# Patient Record
Sex: MALE | Race: WHITE | Employment: OTHER | URBAN - NONMETROPOLITAN AREA
[De-identification: names, ages, dates, MRNs, and addresses within clinical notes are randomized per-mention and may not be internally consistent; named-entity substitution may affect disease eponyms.]

---

## 2024-01-22 ENCOUNTER — NURSE ONLY (OUTPATIENT)
Dept: LAB | Age: 89
End: 2024-01-22

## 2024-12-19 ENCOUNTER — HOSPITAL ENCOUNTER (OUTPATIENT)
Age: 89
Discharge: HOME | End: 2024-12-19
Attending: INTERNAL MEDICINE | Admitting: INTERNAL MEDICINE
Payer: MEDICARE

## 2024-12-19 VITALS
OXYGEN SATURATION: 99 % | DIASTOLIC BLOOD PRESSURE: 70 MMHG | WEIGHT: 178.57 LBS | HEIGHT: 72 IN | RESPIRATION RATE: 18 BRPM | SYSTOLIC BLOOD PRESSURE: 143 MMHG | BODY MASS INDEX: 24.19 KG/M2 | TEMPERATURE: 97.3 F | HEART RATE: 80 BPM

## 2024-12-19 DIAGNOSIS — R94.39 ABNORMAL STRESS TEST: ICD-10-CM

## 2024-12-19 LAB
ANION GAP SERPL CALCULATED.3IONS-SCNC: 10 MMOL/L (ref 9–16)
BUN SERPL-MCNC: 33 MG/DL (ref 8–23)
CALCIUM SERPL-MCNC: 9.5 MG/DL (ref 8.8–10.2)
CHLORIDE SERPL-SCNC: 102 MMOL/L (ref 98–107)
CO2 SERPL-SCNC: 26 MMOL/L (ref 20–31)
CREAT SERPL-MCNC: 1.6 MG/DL (ref 0.7–1.2)
ERYTHROCYTE [DISTWIDTH] IN BLOOD BY AUTOMATED COUNT: 13.2 % (ref 11.8–14.4)
GFR, ESTIMATED: 43 ML/MIN/1.73M2
GLUCOSE SERPL-MCNC: 193 MG/DL (ref 82–115)
HCT VFR BLD AUTO: 40.9 % (ref 40.7–50.3)
HGB BLD-MCNC: 13.2 G/DL (ref 13–17)
MCH RBC QN AUTO: 30.9 PG (ref 25.2–33.5)
MCHC RBC AUTO-ENTMCNC: 32.3 G/DL (ref 28.4–34.8)
MCV RBC AUTO: 95.8 FL (ref 82.6–102.9)
NRBC BLD-RTO: 0 PER 100 WBC
PLATELET # BLD AUTO: 186 K/UL (ref 138–453)
PMV BLD AUTO: 10.2 FL (ref 8.1–13.5)
POTASSIUM SERPL-SCNC: 4.3 MMOL/L (ref 3.7–5.3)
RBC # BLD AUTO: 4.27 M/UL (ref 4.21–5.77)
SODIUM SERPL-SCNC: 137 MMOL/L (ref 136–145)
WBC OTHER # BLD: 7.6 K/UL (ref 3.5–11.3)

## 2024-12-19 PROCEDURE — 99222 1ST HOSP IP/OBS MODERATE 55: CPT | Performed by: INTERNAL MEDICINE

## 2024-12-19 PROCEDURE — 93459 L HRT ART/GRFT ANGIO: CPT | Performed by: INTERNAL MEDICINE

## 2024-12-19 PROCEDURE — 6360000004 HC RX CONTRAST MEDICATION: Performed by: INTERNAL MEDICINE

## 2024-12-19 PROCEDURE — 85027 COMPLETE CBC AUTOMATED: CPT

## 2024-12-19 PROCEDURE — 80048 BASIC METABOLIC PNL TOTAL CA: CPT

## 2024-12-19 PROCEDURE — 2709999900 HC NON-CHARGEABLE SUPPLY: Performed by: INTERNAL MEDICINE

## 2024-12-19 PROCEDURE — C1894 INTRO/SHEATH, NON-LASER: HCPCS | Performed by: INTERNAL MEDICINE

## 2024-12-19 PROCEDURE — 99153 MOD SED SAME PHYS/QHP EA: CPT | Performed by: INTERNAL MEDICINE

## 2024-12-19 PROCEDURE — 2580000003 HC RX 258: Performed by: INTERNAL MEDICINE

## 2024-12-19 PROCEDURE — 6360000002 HC RX W HCPCS: Performed by: INTERNAL MEDICINE

## 2024-12-19 PROCEDURE — 99152 MOD SED SAME PHYS/QHP 5/>YRS: CPT | Performed by: INTERNAL MEDICINE

## 2024-12-19 RX ORDER — SODIUM CHLORIDE 9 MG/ML
INJECTION, SOLUTION INTRAVENOUS CONTINUOUS
Status: DISCONTINUED | OUTPATIENT
Start: 2024-12-19 | End: 2024-12-19 | Stop reason: HOSPADM

## 2024-12-19 RX ORDER — SODIUM CHLORIDE 0.9 % (FLUSH) 0.9 %
5-40 SYRINGE (ML) INJECTION EVERY 12 HOURS SCHEDULED
Status: DISCONTINUED | OUTPATIENT
Start: 2024-12-19 | End: 2024-12-19 | Stop reason: HOSPADM

## 2024-12-19 RX ORDER — SODIUM CHLORIDE 0.9 % (FLUSH) 0.9 %
5-40 SYRINGE (ML) INJECTION PRN
Status: DISCONTINUED | OUTPATIENT
Start: 2024-12-19 | End: 2024-12-19 | Stop reason: HOSPADM

## 2024-12-19 RX ORDER — SODIUM CHLORIDE 9 MG/ML
INJECTION, SOLUTION INTRAVENOUS PRN
Status: DISCONTINUED | OUTPATIENT
Start: 2024-12-19 | End: 2024-12-19 | Stop reason: HOSPADM

## 2024-12-19 RX ORDER — ACETAMINOPHEN 325 MG/1
650 TABLET ORAL EVERY 4 HOURS PRN
Status: DISCONTINUED | OUTPATIENT
Start: 2024-12-19 | End: 2024-12-19 | Stop reason: HOSPADM

## 2024-12-19 RX ORDER — IOPAMIDOL 755 MG/ML
INJECTION, SOLUTION INTRAVASCULAR PRN
Status: DISCONTINUED | OUTPATIENT
Start: 2024-12-19 | End: 2024-12-19 | Stop reason: HOSPADM

## 2024-12-19 RX ORDER — FENTANYL CITRATE 50 UG/ML
INJECTION, SOLUTION INTRAMUSCULAR; INTRAVENOUS PRN
Status: DISCONTINUED | OUTPATIENT
Start: 2024-12-19 | End: 2024-12-19 | Stop reason: HOSPADM

## 2024-12-19 RX ADMIN — SODIUM CHLORIDE: 9 INJECTION, SOLUTION INTRAVENOUS at 15:48

## 2024-12-19 RX ADMIN — SODIUM CHLORIDE: 9 INJECTION, SOLUTION INTRAVENOUS at 10:45

## 2024-12-19 ASSESSMENT — PAIN - FUNCTIONAL ASSESSMENT: PAIN_FUNCTIONAL_ASSESSMENT: 0-10

## 2024-12-19 NOTE — DISCHARGE INSTRUCTIONS
Discharge Instructions Following Heart Catheterization or Intervention    After the Procedure  The effects of the medication or sedation that were used before and/or during your procedure can last for up to 24 hours. Due to the medication or sedation in your system you should not:  Drive a car, operate machinery or power tools  Drink alcoholic beverages  Make important decisions that might be affected by your judgment    Following your procedure, rest at home for the remainder of the day. Do not exercise and do not lift heavy objects greater than 10 pounds.    Days after the Procedure  Resume your low-fat, low-cholesterol diet upon discharge from the hospital.  Instructions for activities are as follows:    FEMORAL ACCESS  _____ Cardiac cath:  Do not drive for three days  Do not bend, push, or pull more than 10lbs for one week  Do not get your heart rate up via exercise for at least one week or as indicated by your physician  Do not return to work for three days or as indicated by your physician  _____ Interventional Procedure:  Do not drive for two weeks  Do not bend, push, or pull more than 10lbs for two weeks  Do not get heart rate up via exercise for at least 2 weeks or as indicated by your physician  Do not return to work until after the follow up appointment      To help eliminate the contrast material from your body, we encourage you to increase your fluid intake following your catheterization procedure. (8-10 eight oz. Non-alcoholic fluids. I.e. Water, juice)  Patients with CHF and renal disease should follow usual fluid intake instructions  Wear loose fitting clothing over the catheter insertion site for the next 72 hours.   Remove all bandages or dressings the morning after the procedure. Keep the catheter insertion site clean and dry.  You may take a shower the morning after the procedure. Avoid immersing the insertion site in water. Wash the insertion site with soap and water and pat dry. Do not apply  powder, perfumes, or ointments in the area. Avoid activities such as swimming or sitting in the hot tub until the catheter insertion site has completely healed. This healing process takes approximately seven days.  You may have a small knot (not larger than the size of a quarter) in the groin. You may also have a bruise in the groin, near the insertion site. This will gradually go away.  When at home following the catheterization if you start to experience chest pain, pressure, tightness, or burning in the chest, arm, jaw, or stomach call office during regular business hours and call 911 after regular business hours. They should take you immediately to the nearest emergency center.       Call your doctor if:  There is bleeding at the catheter insertion site that does not stop when you apply pressure.  Your arm or leg below where the catheter was inserted changes color, is cool to the touch, or is numb.  The small incision for your catheter becomes red or painful, or yellow or green discharge is draining from it.   You have chest pain or shortness of breath that does not go away with rest.  Your pulse feels irregular - it is very slow (fewer than 60 beats a minute) or very fast (over 100 to 120 beats a minute).  You have dizziness, fainting, or you are very tired.  You have problems taking any of your heart medicines.  You have chills or a fever over 101 degrees ferenheit.

## 2024-12-19 NOTE — PROGRESS NOTES
ADMISSION NOTE         Patient admitted to room: 2037     Time of admit: 1535     Admit from: cath lab      Reason for admission: heart cath      Where patient has been residing for the last 24 hrs: home      Has the patient been admitted to any facility in the last 4 weeks, which one:  no      Family at bedside: Yes     Patient is currently: resting in bed comfortably, vitals obtained, telemetry placed on pt. No distress noted. Patient has been oriented to room, educated on how to use call light, and to call for assistance prior to getting up. Bed in lowest and locked position. 2 siderails up for safety. Call light within reach.

## 2024-12-19 NOTE — H&P
History and Physical Service   [facility]    HISTORY AND PHYSICAL EXAMINATION            Date of Evaluation: 12/19/2024  Patient name:  Leonarda West  MRN:   [id number]  YOB: 1935  PCP:    Boy Jiménez APRN - CNP    History Obtained From:     Patient, medical records    History of Present Illness:     This is Leonarda West a 89 y.o. male who presents today for a Left heart cath / coronary angiography  PT ARRIVING AT 1030 FOR IV FLUIDS by Sherrell Stallworth MD for Abnormal stress test. Patient has known cardiac history including atrial fibrillation s/p CABG with medication induced conversion (no issues since), CAD s/p CABG x 2 2006, hypertension, hyperlipidemia. Patient had nuclear stress test completed on 10/31/2024 due to recurrent chest pain despite taking nitroglycerin. Per telephone encounter on 11/04/2024 per Dr. Muir \"His stress test is mostly normal, but had some PVCs and is having chest pain, despite medications. He should be scheduled for cath if he is agreeable\". Current patient complains of daily chest pain that subsides without treatment in \"about 3 minutes\". He has associated shortness of breath with these episodes. Denies fever, chills, cough, congestion, wheezing,  open sores or wounds. Known diabetes, BS today 193. Last aspirin 81 mg and Plavix 12/19/2024.     Received nephrology clearance for procedure today- note in paper chart. Patient arrived early to unit for pre-procedure hydration per nephrology orders.    Past Medical History:     Past Medical History:   Diagnosis Date    Atrial fibrillation (HCC)     treated medically and converted after surgery    CAD (coronary artery disease)     Diabetes mellitus (HCC)     Examination of participant in clinical trial 11/11/13    6 year follow up, estimated completion NOV 2019    Hyperlipidemia     Hypertension         Past Surgical History:     Past Surgical History:   Procedure Laterality Date    BACK SURGERY      CARDIAC

## 2024-12-20 LAB — ECHO BSA: 2.03 M2

## 2024-12-20 NOTE — FLOWSHEET NOTE
12/19/24 1917   AVS Reviewed   AVS & discharge instructions reviewed with patient and/or representative? Yes   Reviewed instructions with Patient   Level of Understanding Questions answered;Teach back completed;Verbalized understanding       Discharge Note:      All discharge instructions given at this time as well as all patient belongings returned to patient. Pt denies any further questions regarding discharge at this time. Pt given discharge packet, discharge instructions/restrictions and medication handouts regarding all discharge medications and side effects. Pt denies any further issues at this time.    Pt wheeled out to front discharge doors at this time.     Pt left premises without any issues in private vehicle at this time.

## (undated) DEVICE — CATHETER DIAG AD 6FR L100CM 0.038IN STD COR POLYUR JUDKINS

## (undated) DEVICE — BAND COMPR L24CM REG CLR PLAS HEMSTAT EXT HK AND LOOP RETEN

## (undated) DEVICE — CATHETER DIAG SM AD 6FR L100CM 0.038IN COR POLYUR JUDKINS L

## (undated) DEVICE — CATHETER DIAG AD 6FR L100CM 0.038IN COR POLYUR INT MAMM

## (undated) DEVICE — GLIDESHEATH SLENDER STAINLESS STEEL KIT: Brand: GLIDESHEATH SLENDER